# Patient Record
Sex: MALE | Race: WHITE | URBAN - METROPOLITAN AREA
[De-identification: names, ages, dates, MRNs, and addresses within clinical notes are randomized per-mention and may not be internally consistent; named-entity substitution may affect disease eponyms.]

---

## 2017-05-31 ENCOUNTER — HISTORICAL (OUTPATIENT)
Dept: ADMINISTRATIVE | Facility: HOSPITAL | Age: 77
End: 2017-05-31

## 2017-05-31 LAB
ALBUMIN SERPL-MCNC: 3.5 GM/DL (ref 3.4–5)
ALP SERPL-CCNC: 61 UNIT/L (ref 50–136)
ALT SERPL-CCNC: 25 UNIT/L (ref 12–78)
AST SERPL-CCNC: 11 UNIT/L (ref 15–37)
BILIRUB SERPL-MCNC: 0.5 MG/DL (ref 0.2–1)
BILIRUBIN DIRECT+TOT PNL SERPL-MCNC: 0.2 MG/DL (ref 0–0.2)
BILIRUBIN DIRECT+TOT PNL SERPL-MCNC: 0.3 MG/DL (ref 0–0.8)
LIVER PROFILE INTERP: ABNORMAL
PROT SERPL-MCNC: 7.1 GM/DL (ref 6.4–8.2)
VALPROATE SERPL-MCNC: 72.1 MCG/ML (ref 50–100)

## 2017-07-11 ENCOUNTER — HISTORICAL (OUTPATIENT)
Dept: RADIOLOGY | Facility: HOSPITAL | Age: 77
End: 2017-07-11

## 2017-07-11 LAB
ABS NEUT (OLG): 1.22 X10(3)/MCL (ref 2.1–9.2)
ALBUMIN SERPL-MCNC: 3.4 GM/DL (ref 3.4–5)
ALBUMIN/GLOB SERPL: 1.1 {RATIO}
ALP SERPL-CCNC: 57 UNIT/L (ref 50–136)
ALT SERPL-CCNC: 35 UNIT/L (ref 12–78)
AST SERPL-CCNC: 17 UNIT/L (ref 15–37)
BILIRUB SERPL-MCNC: 0.5 MG/DL (ref 0.2–1)
BILIRUBIN DIRECT+TOT PNL SERPL-MCNC: 0.2 MG/DL (ref 0–0.2)
BILIRUBIN DIRECT+TOT PNL SERPL-MCNC: 0.3 MG/DL (ref 0–0.8)
BUN SERPL-MCNC: 26 MG/DL (ref 7–18)
CALCIUM SERPL-MCNC: 8.3 MG/DL (ref 8.5–10.1)
CHLORIDE SERPL-SCNC: 107 MMOL/L (ref 98–107)
CHOLEST SERPL-MCNC: 102 MG/DL (ref 0–200)
CHOLEST/HDLC SERPL: 2 {RATIO} (ref 0–5)
CK SERPL-CCNC: 76 UNIT/L (ref 39–308)
CO2 SERPL-SCNC: 31 MMOL/L (ref 21–32)
CREAT SERPL-MCNC: 0.81 MG/DL (ref 0.7–1.3)
EOSINOPHIL NFR BLD MANUAL: 6 % (ref 0–8)
ERYTHROCYTE [DISTWIDTH] IN BLOOD BY AUTOMATED COUNT: 17 % (ref 11.5–17)
GLOBULIN SER-MCNC: 3.2 GM/DL (ref 2.4–3.5)
GLUCOSE SERPL-MCNC: 92 MG/DL (ref 74–106)
HCT VFR BLD AUTO: 37.2 % (ref 42–52)
HDLC SERPL-MCNC: 52 MG/DL (ref 35–60)
HGB BLD-MCNC: 11.9 GM/DL (ref 14–18)
LDLC SERPL CALC-MCNC: 33 MG/DL (ref 0–129)
LYMPHOCYTES NFR BLD MANUAL: 17 %
LYMPHOCYTES NFR BLD MANUAL: 43 % (ref 13–40)
MCH RBC QN AUTO: 25.2 PG (ref 27–31)
MCHC RBC AUTO-ENTMCNC: 32 GM/DL (ref 33–36)
MCV RBC AUTO: 78.8 FL (ref 80–94)
MONOCYTES NFR BLD MANUAL: 5 % (ref 2–11)
NEUTROPHILS NFR BLD MANUAL: 28 % (ref 47–80)
PLATELET # BLD AUTO: 128 X10(3)/MCL (ref 130–400)
PLATELET # BLD EST: ABNORMAL 10*3/UL
PMV BLD AUTO: 11 FL (ref 7.4–10.4)
POTASSIUM SERPL-SCNC: 4.3 MMOL/L (ref 3.5–5.1)
PROT SERPL-MCNC: 6.6 GM/DL (ref 6.4–8.2)
RBC # BLD AUTO: 4.72 X10(6)/MCL (ref 4.7–6.1)
SODIUM SERPL-SCNC: 143 MMOL/L (ref 136–145)
TRIGL SERPL-MCNC: 84 MG/DL (ref 30–150)
VLDLC SERPL CALC-MCNC: 17 MG/DL
WBC # SPEC AUTO: 3.9 X10(3)/MCL (ref 4.5–11.5)

## 2017-07-31 ENCOUNTER — HISTORICAL (OUTPATIENT)
Dept: ADMINISTRATIVE | Facility: HOSPITAL | Age: 77
End: 2017-07-31

## 2017-07-31 LAB
ALBUMIN SERPL-MCNC: 3.4 GM/DL (ref 3.4–5)
ALP SERPL-CCNC: 59 UNIT/L (ref 50–136)
ALT SERPL-CCNC: 32 UNIT/L (ref 12–78)
AST SERPL-CCNC: 19 UNIT/L (ref 15–37)
BILIRUB SERPL-MCNC: 0.6 MG/DL (ref 0.2–1)
BILIRUBIN DIRECT+TOT PNL SERPL-MCNC: 0.1 MG/DL (ref 0–0.2)
BILIRUBIN DIRECT+TOT PNL SERPL-MCNC: 0.5 MG/DL (ref 0–0.8)
LIVER PROFILE INTERP: NORMAL
PROT SERPL-MCNC: 6.8 GM/DL (ref 6.4–8.2)
VALPROATE SERPL-MCNC: 71.5 MCG/ML (ref 50–100)

## 2017-09-28 ENCOUNTER — HISTORICAL (OUTPATIENT)
Dept: ADMINISTRATIVE | Facility: HOSPITAL | Age: 77
End: 2017-09-28

## 2017-09-28 ENCOUNTER — HISTORICAL (OUTPATIENT)
Dept: NEUROSURGERY | Facility: CLINIC | Age: 77
End: 2017-09-28

## 2017-09-28 LAB
ABS NEUT (OLG): 1.21 X10(3)/MCL (ref 2.1–9.2)
APTT PPP: 29.1 SECOND(S) (ref 24.8–36.9)
BASOPHILS # BLD AUTO: 0 X10(3)/MCL (ref 0–0.2)
BASOPHILS NFR BLD AUTO: 0 %
BUN SERPL-MCNC: 29 MG/DL (ref 7–18)
CALCIUM SERPL-MCNC: 9.1 MG/DL (ref 8.5–10.1)
CHLORIDE SERPL-SCNC: 104 MMOL/L (ref 98–107)
CO2 SERPL-SCNC: 30 MMOL/L (ref 21–32)
CREAT SERPL-MCNC: 0.96 MG/DL (ref 0.7–1.3)
EOSINOPHIL # BLD AUTO: 0.1 X10(3)/MCL (ref 0–0.9)
EOSINOPHIL NFR BLD AUTO: 2 %
ERYTHROCYTE [DISTWIDTH] IN BLOOD BY AUTOMATED COUNT: 15.8 % (ref 11.5–17)
GLUCOSE SERPL-MCNC: 92 MG/DL (ref 74–106)
HCT VFR BLD AUTO: 39.3 % (ref 42–52)
HGB BLD-MCNC: 12.5 GM/DL (ref 14–18)
INR PPP: 1.13 (ref 0–1.27)
LYMPHOCYTES # BLD AUTO: 2.6 X10(3)/MCL (ref 0.6–4.6)
LYMPHOCYTES NFR BLD AUTO: 61 %
MCH RBC QN AUTO: 25 PG (ref 27–31)
MCHC RBC AUTO-ENTMCNC: 31.8 GM/DL (ref 33–36)
MCV RBC AUTO: 78.4 FL (ref 80–94)
MONOCYTES # BLD AUTO: 0.4 X10(3)/MCL (ref 0.1–1.3)
MONOCYTES NFR BLD AUTO: 8 %
NEUTROPHILS # BLD AUTO: 1.21 X10(3)/MCL (ref 1.4–7.9)
NEUTROPHILS NFR BLD AUTO: 28 %
PLATELET # BLD AUTO: 139 X10(3)/MCL (ref 130–400)
PMV BLD AUTO: 10.5 FL (ref 9.4–12.4)
POTASSIUM SERPL-SCNC: 4.2 MMOL/L (ref 3.5–5.1)
PROTHROMBIN TIME: 14.3 SECOND(S) (ref 12.2–14.7)
RBC # BLD AUTO: 5.01 X10(6)/MCL (ref 4.7–6.1)
SODIUM SERPL-SCNC: 144 MMOL/L (ref 136–145)
WBC # SPEC AUTO: 4.3 X10(3)/MCL (ref 4.5–11.5)

## 2018-01-13 ENCOUNTER — HISTORICAL (OUTPATIENT)
Dept: ADMINISTRATIVE | Facility: HOSPITAL | Age: 78
End: 2018-01-13

## 2018-01-15 LAB — FINAL CULTURE: NORMAL

## 2018-04-11 ENCOUNTER — HISTORICAL (OUTPATIENT)
Dept: LAB | Facility: HOSPITAL | Age: 78
End: 2018-04-11

## 2018-04-11 LAB
APPEARANCE, UA: CLEAR
BACTERIA #/AREA URNS AUTO: ABNORMAL /HPF
BILIRUB UR QL STRIP: NEGATIVE
COLOR UR: YELLOW
GLUCOSE (UA): NEGATIVE
HGB UR QL STRIP: NEGATIVE
KETONES UR QL STRIP: NEGATIVE
LEUKOCYTE ESTERASE UR QL STRIP: NEGATIVE
MUCOUS THREADS URNS QL MICRO: ABNORMAL
NITRITE UR QL STRIP.AUTO: NEGATIVE
PH UR STRIP: 6 [PH] (ref 5–9)
PROT UR QL STRIP: NEGATIVE
RBC #/AREA URNS HPF: ABNORMAL /[HPF]
SP GR UR STRIP: 1.02 (ref 1–1.03)
SQUAMOUS EPITHELIAL, UA: ABNORMAL
UROBILINOGEN UR STRIP-ACNC: 0.2
WBC #/AREA URNS AUTO: ABNORMAL /[HPF]

## 2018-04-13 LAB — FINAL CULTURE: NO GROWTH

## 2018-06-08 ENCOUNTER — HISTORICAL (OUTPATIENT)
Dept: LAB | Facility: HOSPITAL | Age: 78
End: 2018-06-08

## 2018-06-08 LAB
APPEARANCE, UA: NORMAL
BACTERIA #/AREA URNS AUTO: NORMAL /HPF
BILIRUB UR QL STRIP: NEGATIVE
BUN SERPL-MCNC: 29 MG/DL (ref 7–18)
CALCIUM SERPL-MCNC: 8.6 MG/DL (ref 8.5–10.1)
CHLORIDE SERPL-SCNC: 107 MMOL/L (ref 98–107)
CO2 SERPL-SCNC: 28 MMOL/L (ref 21–32)
COLOR UR: YELLOW
CREAT SERPL-MCNC: 0.82 MG/DL (ref 0.7–1.3)
CREAT/UREA NIT SERPL: 35.4
GLUCOSE (UA): NEGATIVE
GLUCOSE SERPL-MCNC: 104 MG/DL (ref 74–106)
HGB UR QL STRIP: NEGATIVE
KETONES UR QL STRIP: NEGATIVE
LEUKOCYTE ESTERASE UR QL STRIP: NEGATIVE
NITRITE UR QL STRIP.AUTO: NEGATIVE
PH UR STRIP: 8 [PH] (ref 5–9)
POTASSIUM SERPL-SCNC: 4.4 MMOL/L (ref 3.5–5.1)
PROT UR QL STRIP: NEGATIVE
RBC #/AREA URNS HPF: NORMAL /[HPF]
SODIUM SERPL-SCNC: 144 MMOL/L (ref 136–145)
SP GR UR STRIP: 1.02 (ref 1–1.03)
SQUAMOUS EPITHELIAL, UA: NORMAL
UROBILINOGEN UR STRIP-ACNC: 1
WBC #/AREA URNS AUTO: NORMAL /HPF (ref 0–3)

## 2018-08-23 ENCOUNTER — HOSPITAL ENCOUNTER (OUTPATIENT)
Dept: INTENSIVE CARE | Facility: HOSPITAL | Age: 78
End: 2018-08-25
Attending: INTERNAL MEDICINE | Admitting: INTERNAL MEDICINE

## 2018-08-23 LAB
ABS NEUT (OLG): 1.56 X10(3)/MCL (ref 2.1–9.2)
ALBUMIN SERPL-MCNC: 2.9 GM/DL (ref 3.4–5)
ALBUMIN/GLOB SERPL: 0.7 {RATIO}
ALP SERPL-CCNC: 77 UNIT/L (ref 50–136)
ALT SERPL-CCNC: 19 UNIT/L (ref 12–78)
APPEARANCE, UA: CLEAR
AST SERPL-CCNC: 25 UNIT/L (ref 15–37)
BACTERIA SPEC CULT: NORMAL /HPF
BASOPHILS # BLD AUTO: 0 X10(3)/MCL (ref 0–0.2)
BASOPHILS NFR BLD AUTO: 0 %
BILIRUB SERPL-MCNC: 0.3 MG/DL (ref 0.2–1)
BILIRUB UR QL STRIP: NEGATIVE
BILIRUBIN DIRECT+TOT PNL SERPL-MCNC: 0.1 MG/DL (ref 0–0.2)
BILIRUBIN DIRECT+TOT PNL SERPL-MCNC: 0.2 MG/DL (ref 0–0.8)
BUN SERPL-MCNC: 28 MG/DL (ref 7–18)
CALCIUM SERPL-MCNC: 8.6 MG/DL (ref 8.5–10.1)
CHLORIDE SERPL-SCNC: 104 MMOL/L (ref 98–107)
CO2 SERPL-SCNC: 31 MMOL/L (ref 21–32)
COLOR UR: YELLOW
CREAT SERPL-MCNC: 0.91 MG/DL (ref 0.7–1.3)
EOSINOPHIL # BLD AUTO: 0.1 X10(3)/MCL (ref 0–0.9)
EOSINOPHIL NFR BLD AUTO: 1 %
ERYTHROCYTE [DISTWIDTH] IN BLOOD BY AUTOMATED COUNT: 17.2 % (ref 11.5–17)
GLOBULIN SER-MCNC: 4 GM/DL (ref 2.4–3.5)
GLUCOSE (UA): NEGATIVE
GLUCOSE SERPL-MCNC: 101 MG/DL (ref 74–106)
HCT VFR BLD AUTO: 34.5 % (ref 42–52)
HGB BLD-MCNC: 10.9 GM/DL (ref 14–18)
HGB UR QL STRIP: NEGATIVE
KETONES UR QL STRIP: NEGATIVE
LEUKOCYTE ESTERASE UR QL STRIP: NEGATIVE
LYMPHOCYTES # BLD AUTO: 2.7 X10(3)/MCL (ref 0.6–4.6)
LYMPHOCYTES NFR BLD AUTO: 56 %
MAGNESIUM SERPL-MCNC: 2.2 MG/DL (ref 1.8–2.4)
MCH RBC QN AUTO: 26.3 PG (ref 27–31)
MCHC RBC AUTO-ENTMCNC: 31.6 GM/DL (ref 33–36)
MCV RBC AUTO: 83.3 FL (ref 80–94)
MONOCYTES # BLD AUTO: 0.5 X10(3)/MCL (ref 0.1–1.3)
MONOCYTES NFR BLD AUTO: 10 %
NEUTROPHILS # BLD AUTO: 1.56 X10(3)/MCL (ref 1.4–7.9)
NEUTROPHILS NFR BLD AUTO: 32 %
NITRITE UR QL STRIP: NEGATIVE
PH UR STRIP: 6.5 [PH] (ref 5–9)
PHOSPHATE SERPL-MCNC: 3.7 MG/DL (ref 2.5–4.9)
PLATELET # BLD AUTO: 146 X10(3)/MCL (ref 130–400)
PMV BLD AUTO: 9.3 FL (ref 9.4–12.4)
POTASSIUM SERPL-SCNC: 4.2 MMOL/L (ref 3.5–5.1)
PROT SERPL-MCNC: 6.9 GM/DL (ref 6.4–8.2)
PROT UR QL STRIP: NEGATIVE
RBC # BLD AUTO: 4.14 X10(6)/MCL (ref 4.7–6.1)
RBC #/AREA URNS HPF: NORMAL /[HPF]
SODIUM SERPL-SCNC: 140 MMOL/L (ref 136–145)
SP GR UR STRIP: 1.02 (ref 1–1.03)
SQUAMOUS EPITHELIAL, UA: NORMAL
TROPONIN I SERPL-MCNC: <0.02 NG/ML (ref 0.02–0.49)
UROBILINOGEN UR STRIP-ACNC: 1
VALPROATE SERPL-MCNC: 90 MCG/ML (ref 50–100)
WBC # SPEC AUTO: 4.9 X10(3)/MCL (ref 4.5–11.5)
WBC #/AREA URNS HPF: NORMAL /HPF

## 2018-08-26 LAB — FINAL CULTURE: NO GROWTH

## 2018-12-04 ENCOUNTER — HISTORICAL (OUTPATIENT)
Dept: LAB | Facility: HOSPITAL | Age: 78
End: 2018-12-04

## 2018-12-04 LAB
BUN SERPL-MCNC: 28 MG/DL (ref 7–18)
CALCIUM SERPL-MCNC: 8.8 MG/DL (ref 8.5–10.1)
CHLORIDE SERPL-SCNC: 104 MMOL/L (ref 98–107)
CO2 SERPL-SCNC: 31 MMOL/L (ref 21–32)
CREAT SERPL-MCNC: 0.91 MG/DL (ref 0.7–1.3)
CREAT/UREA NIT SERPL: 30.8
GLUCOSE SERPL-MCNC: 103 MG/DL (ref 74–106)
POTASSIUM SERPL-SCNC: 4.6 MMOL/L (ref 3.5–5.1)
SODIUM SERPL-SCNC: 140 MMOL/L (ref 136–145)

## 2018-12-05 ENCOUNTER — HISTORICAL (OUTPATIENT)
Dept: LAB | Facility: HOSPITAL | Age: 78
End: 2018-12-05

## 2018-12-05 LAB
APPEARANCE, UA: CLEAR
BACTERIA SPEC CULT: ABNORMAL /HPF
BILIRUB UR QL STRIP: NEGATIVE
COLOR UR: YELLOW
GLUCOSE (UA): NEGATIVE
HGB UR QL STRIP: ABNORMAL
KETONES UR QL STRIP: NEGATIVE
LEUKOCYTE ESTERASE UR QL STRIP: ABNORMAL
NITRITE UR QL STRIP: POSITIVE
PH UR STRIP: 6.5 [PH] (ref 5–9)
PROT UR QL STRIP: NEGATIVE
RBC #/AREA URNS HPF: ABNORMAL /HPF
SP GR UR STRIP: 1.02 (ref 1–1.03)
SQUAMOUS EPITHELIAL, UA: ABNORMAL
UA WBC MAN: ABNORMAL /HPF
UROBILINOGEN UR STRIP-ACNC: 0.2

## 2018-12-12 ENCOUNTER — HISTORICAL (OUTPATIENT)
Dept: LAB | Facility: HOSPITAL | Age: 78
End: 2018-12-12

## 2018-12-12 LAB
BUN SERPL-MCNC: 28 MG/DL (ref 7–18)
CALCIUM SERPL-MCNC: 8.5 MG/DL (ref 8.5–10.1)
CHLORIDE SERPL-SCNC: 105 MMOL/L (ref 98–107)
CO2 SERPL-SCNC: 28 MMOL/L (ref 21–32)
CREAT SERPL-MCNC: 1.03 MG/DL (ref 0.7–1.3)
CREAT/UREA NIT SERPL: 27.2
GLUCOSE SERPL-MCNC: 113 MG/DL (ref 74–106)
POTASSIUM SERPL-SCNC: 4.3 MMOL/L (ref 3.5–5.1)
SODIUM SERPL-SCNC: 142 MMOL/L (ref 136–145)

## 2019-04-17 ENCOUNTER — HISTORICAL (OUTPATIENT)
Dept: LAB | Facility: HOSPITAL | Age: 79
End: 2019-04-17

## 2019-04-17 LAB
APPEARANCE, UA: ABNORMAL
BACTERIA SPEC CULT: ABNORMAL /HPF
BILIRUB UR QL STRIP: NEGATIVE
COLOR UR: YELLOW
GLUCOSE (UA): NEGATIVE
HGB UR QL STRIP: ABNORMAL
KETONES UR QL STRIP: ABNORMAL
LEUKOCYTE ESTERASE UR QL STRIP: ABNORMAL
NITRITE UR QL STRIP: NEGATIVE
PH UR STRIP: 5.5 [PH] (ref 5–9)
PROT UR QL STRIP: ABNORMAL
RBC #/AREA URNS HPF: 21 /HPF (ref 0–2)
SP GR UR STRIP: 1.03 (ref 1–1.03)
SQUAMOUS EPITHELIAL, UA: ABNORMAL
UROBILINOGEN UR STRIP-ACNC: 0.2
WBC #/AREA URNS HPF: 294 /HPF (ref 0–3)

## 2019-05-08 ENCOUNTER — HISTORICAL (OUTPATIENT)
Dept: LAB | Facility: HOSPITAL | Age: 79
End: 2019-05-08

## 2019-05-08 LAB
APPEARANCE, UA: ABNORMAL
BACTERIA #/AREA URNS AUTO: ABNORMAL /HPF
BILIRUB UR QL STRIP: NEGATIVE
COLOR UR: YELLOW
GLUCOSE (UA): NEGATIVE
HGB UR QL STRIP: ABNORMAL
KETONES UR QL STRIP: NEGATIVE
LEUKOCYTE ESTERASE UR QL STRIP: ABNORMAL
NITRITE UR QL STRIP.AUTO: POSITIVE
PH UR STRIP: 7.5 [PH] (ref 5–9)
PROT UR QL STRIP: ABNORMAL
RBC #/AREA URNS HPF: ABNORMAL /[HPF]
SP GR UR STRIP: 1.02 (ref 1–1.03)
SQUAMOUS EPITHELIAL, UA: ABNORMAL
UROBILINOGEN UR STRIP-ACNC: 0.2
WBC #/AREA URNS AUTO: ABNORMAL /HPF

## 2019-05-15 ENCOUNTER — HISTORICAL (OUTPATIENT)
Dept: LAB | Facility: HOSPITAL | Age: 79
End: 2019-05-15

## 2019-05-15 LAB
APPEARANCE, UA: ABNORMAL
BACTERIA SPEC CULT: ABNORMAL /HPF
BILIRUB UR QL STRIP: NEGATIVE
COLOR UR: ABNORMAL
GLUCOSE (UA): NEGATIVE
HGB UR QL STRIP: ABNORMAL
KETONES UR QL STRIP: ABNORMAL
LEUKOCYTE ESTERASE UR QL STRIP: ABNORMAL
NITRITE UR QL STRIP: NEGATIVE
PH UR STRIP: 5.5 [PH] (ref 5–9)
PROT UR QL STRIP: ABNORMAL
RBC #/AREA URNS HPF: ABNORMAL /HPF
SP GR UR STRIP: 1.03 (ref 1–1.03)
SQUAMOUS EPITHELIAL, UA: ABNORMAL
UROBILINOGEN UR STRIP-ACNC: 0.2
WBC #/AREA URNS HPF: 21 /HPF (ref 0–3)

## 2019-05-17 LAB — FINAL CULTURE: NO GROWTH

## 2022-04-10 ENCOUNTER — HISTORICAL (OUTPATIENT)
Dept: ADMINISTRATIVE | Facility: HOSPITAL | Age: 82
End: 2022-04-10

## 2022-04-29 VITALS
HEIGHT: 63 IN | DIASTOLIC BLOOD PRESSURE: 78 MMHG | WEIGHT: 170.63 LBS | SYSTOLIC BLOOD PRESSURE: 122 MMHG | BODY MASS INDEX: 30.23 KG/M2

## 2022-04-30 NOTE — CONSULTS
DATE OF CONSULTATION:  08/24/2018    ATTENDING PHYSICIAN:  Physician ER  CONSULTING PHYSICIAN:  Sergey Pompa MD    CHIEF COMPLAINT:  Urinary frequency.    HISTORY OF PRESENT ILLNESS:  This patient is a 77-year-old  male with a history of Parkinson's-like disease and Alzheimer's encephalitis who presents to the emergency department on 08/23 for complaints of confusion, weakness, and urinary frequency and urgency.  The patient is a poor historian himself.  Much of the history was obtained from medical records and his wife.  His wife, his daughter, and his sitter were there on my evaluation.     From a urologic standpoint, they state that he was recently started on tamsulosin during a separate admission.  He follows normally with Dr. Knowles.  He has not had any baseline urodynamics.  During this admission, his lab work was unremarkable.  He had CT of the head and MRI that were negative.  Neurology was consulted for evaluation as well.  They state he has had urinary frequency once an hour.  No bladder scans have been done.  I do not see any urine cultures.  He had a culture from June that was negative.  He has never been on any bladder medications.    REVIEW OF SYSTEMS:  12-point review of systems negative other than HPI.    ALLERGIES:  No known drug allergies.    MEDICATIONS:  Medication admin record well documented in the chart and reviewed.    PAST MEDICAL HISTORY:  Anxiety, Alzheimer's, Parkinson's, BPH, dyslipidemia, and obesity.    PAST SURGICAL HISTORY:  Multiple lumbar procedures and spinal punctures, cystoscopy with stents, cystoscopy with [facility] ,hydrocelectomy, and tonsillectomy.    SOCIAL HISTORY:  Previous alcohol, stopped at age 77.  He lives with his wife and a caretaker.  Does have light tobacco use.  No drugs.    FAMILY HISTORY:  CHF, diabetes, and colon cancer.    PHYSICAL EXAMINATION:  VITAL SIGNS:  Currently his vitals are stable.   GENERAL:  He is well developed, well  nourished, in no acute distress.  He is at his baseline.   HEENT:  Normocephalic, atraumatic.  Pupils equal, round, and reactive to light and accommodation.  Extraocular movements intact.  Nares patent.  Oropharynx clear.   CARDIOVASCULAR:  Regular rate and rhythm.   LUNGS:  Respirations unlabored.   ABDOMEN:  Soft, nontender, nondistended.   EXTREMITIES:  No cyanosis, clubbing, or edema.   :  He is wearing a brief.  Normal external male genitalia.   NEUROLOGIC:  Awake and alert.  He is at his baseline.  No focal deficits from baseline.    ASSESSMENT:  This is a 77-year-old male with Parkinson's-like illness and benign prostatic hypertrophy with urinary urgency and frequency.    PLAN:  I recommend a bladder scan now to ensure no retention.  If his bladder is holding greater than 250 or 300, I will consider Smith catheter decompression for at least 72 hours.  He should continue his Flomax therapy.  They should continue behavior modifications and limit his p.m. fluid intake but increase his fluid intake during the day.  He should avoid caffeine which he has.  If his PVR is less than 200, we can consider starting him on an anticholinergic with solifenacin 10 mg.  Discussed the risk of constipation, dry eyes, dry mouth, and cognitive dysfunction.  I want to start him on Myrbetriq 50 mg but it is non formulary here.  Certainly if he does not tolerate anticholinergics, he can be switched to beta 3 agonist.  He may need baseline urodynamics on an outpatient basis.  We will also check a urine culture to ensure he does not have a UTI which could be exacerbating these irritative lower urinary tract symptoms.  I did have a discussion with them about pathophysiology of Parkinson's-like illness as well as possible concurrent BPH.  Certainly with his age, BPH is very likely with some of the cause of outlet obstruction and obstructive LUTS.  Certainly he may have some sphincteric bradykinesia due to the Parkinson's-like illness.   He also is very much at risk for detrusor overactivity from the Parkinson's as well.  They should also ensure that he is on a good bowel regimen and avoiding any constipation which can contribute to incomplete bladder emptying.  I also think that his mobility can be an issue.  We discussed good voiding modifications.  He definitely should follow up with Dr. Knowles in the next week or two to assess his progress.  She may do need to     do some medication changes.  We appreciate the consult.  Please call with any questions or concerns in the interim.        ______________________________  MD CAMILLA Salomon/  DD:  08/24/2018  Time:  05:21PM  DT:  08/24/2018  Time:  06:21PM  Job #:  [id number]

## 2022-04-30 NOTE — DISCHARGE SUMMARY
Patient:   Thai Gray            MRN: [id number]            FIN: [id number]               Age:   77 years     Sex:  Male     :  1940   Associated Diagnoses:   None   Author:   Good MALDONADO, Rich TUCKER      Discharge Information      Discharge Summary Information   Physicians   Attending Physician - Good MALDONADO, Rich TUCKER  Consulting Physician - Bel MALDONADO, Gigi CRENSHAW  Consulting Physician - Eleni MALDONADO, Piper MICHELLE  Primary Care Physician - Mariella MALDONADO , [facility]     Discharge Diagnosis   Urinary frequency  Alzheimer's Disease  Parkinson's Disease   Procedures   No procedures recorded for this visit.   Immunizations   No immunizations recorded for this visit.     Discharge Medications   Prescribed  Myrbetriq (mirabegron 50 mg oral tablet, extended release) 50 mg, Oral, Daily  tamsulosin (Flomax 0.4 mg oral capsule) 0.4 mg, Oral, Daily  Continue  QUEtiapine (QUEtiapine 50 mg oral tablet) 50 mg, Oral, Daily  QUEtiapine (Seroquel 100 mg oral tablet) 100 mg, Oral, At Bedtime  Template Non-Formulary Med (garcinia cambogia) 1 tab, Oral, Daily  tamsulosin (Flomax 0.4 mg oral capsule) 0.4 mg, Oral, Daily  albuterol (VENTOLIN HFA AER)  carbidopa-levodopa (carbidopa-levodopa 25 mg-100 mg oral tablet) 1 tab(s), Oral, TID  divalproex sodium (Depakote 250 mg oral delayed release tablet) See Instructions  divalproex sodium (Depakote  mg oral tablet, extended release) 1,000 mg, Oral, Once a day (at bedtime)  levothyroxine (levothyroxine 100 mcg (0.1 mg) oral tablet) 100 mcg, Oral, Daily  memantine (Namenda XR 28 mg oral capsule, extended release) 28 mg, Oral, Daily  multivitamin with iron (EnLyte oral capsule) 1 cap(s), Oral, Daily  polyethylene glycol 3350 (MiraLax oral powder for reconstitution) 17 gm, Oral, Daily  rivastigmine (Exelon 9.5 mg/24 hr transdermal film, extended release) 1 patch(es), TD, Daily  rosuvastatin (Crestor 20 mg oral tablet) 20 mg, Oral, Once a day (at bedtime)  sertraline  "(Zoloft 100 mg oral tablet) 150 mg, Oral, Daily  vilazodone (VIIBRYD      TAB 20MG) 20 mg, Oral, Daily  vilazodone (Viibryd 20 mg oral tablet) 20 mg, Oral, Daily  Discontinue  QUEtiapine (QUETIAPINE   TAB 100MG)  QUEtiapine (QUETIAPINE   TAB 25MG) 25 mg, Oral, qPM  carbidopa-levodopa (CARB/LEVO    TAB 10-100MG)  divalproex sodium (DIVALPROEX   TAB 250MG DR) 250 mg, Oral, BID  divalproex sodium (DIVALPROEX   TAB 500MG ER) 1,000 mg, At Bedtime  levothyroxine (LEVOTHYROXIN TAB 100MCG) 100 mcg, Oral, Daily  memantine (MEMANTINE HC CAP 28MG ER)  phenazopyridine (Pyridium 200 mg oral tablet) 200 mg, Oral, TID, PRN as needed for urinary discomfort  rivastigmine (Exelon 9.5 mg/24 hr transdermal film, extended release) 1 patch(es), TD, Daily  sertraline (SERTRALINE   TAB 100MG)        Hospital Course   Hospital Course   The patient is a 77 year old  male with hx of Parkinson's, Alzheimer's, and encephalitis who presented to the ED on 8/23/18 with complaints of confusion, weakness, and urinary incontinence since around midnight. Patient is a poor historian and history was obtained from prior medical records and ED visit. Sitter in room during time of visit. Wife reported patient had eaten his breakfast really fast and began to vomit on the way to a doctor's appointment on 2 days ago. She says he normally has a significant appetite, but a poor intake of fluids. She adds patient was unusually disoriented from his baseline. Wife said patient recovered from N/V episode and was thinking and feeling better yesterday. This morning around 0000, wife states patient woke up "soaking wet" and asking for something to eat. Wife explained he was needing to go to the restroom, but found it difficult to ambulate to the toilet, even with help from his walker. She added patient seems confused too, and is unable to answer what year it is while in the ED. Wife also complained of RLE swelling and right eye swelling that this is been " chronic. Patient denies having any headache, abdominal pain, chest pain, nausea, or rash at present. Labwork in the ED was essentially unremarkable, normal electrolytes, negative troponin, and normal LFTs with the exception of low albumin 2.9, and a low Hbg 10.9. UA in the ED was also unremarkable. CT Head in the ED showed No acute intracranial process or acute infarct identified. Brain MRI was negative as well. Patient has been admitted to the hospitalist service for further management and neurology has been consulted. Neurology evaluated the patient who stated patient was stable for discharge and that a facility may be the next option for the patient given his advancing dementia and Parkinsons. Patient was kept overnight with no acute events. On day of discharge, patient was seen and examined with wife and daughter at the bedside. His private CNA was also in the room, assisting patient with transfer to the bathroom. Wife reported patient had urinated 9 times last night and consistently reports frequent and urgent urination. Urology was consulted. UA was negative.  Urology recommended to continue Flomax and added Myrbetriq. Otherwise, patient had no complaints and agreed with plan for discharge  Discharge to home  Diet: Regular   Activity: As tolerated  Medications: see med rec  Follow up: Urology 1-2 weeks  Total time spent discharging the patient: 31 minutes   .        Physical Examination      Vital Signs (last 24 hrs)_____  Last Charted___________  Temp Oral     36.7 DegC  (AUG 24 08:07)  Heart Rate Peripheral   90 bpm  (AUG 24 12:15)  Resp Rate         18 br/min  (AUG 24 12:15)  SBP      H 144mmHg  (AUG 24 12:15)  DBP      86 mmHg  (AUG 24 12:15)  SpO2      96 %  (AUG 24 12:15)     General:  No acute distress, Obese, comfortable, talkative.    Eye:  Pupils are equal, round and reactive to light, Extraocular movements are intact, Normal conjunctiva.    HENT:  Normocephalic, Oral mucosa is moist, No pharyngeal  erythema.    Neck:  Supple, Non-tender.    Respiratory:  Lungs are clear to auscultation, Respirations are non-labored, Breath sounds are equal.    Cardiovascular:  Normal rate, Regular rhythm, No murmur, Good pulses equal in all extremities, No edema.    Gastrointestinal:  Soft, Non-tender, Non-distended, Normal bowel sounds.    Musculoskeletal:  Normal range of motion, Normal strength, No tenderness, No swelling.    Integumentary:  Warm, Dry, No rash.    Neurologic:  Alert, No focal deficits, Oriented to president and name.    Psychiatric:  Cooperative, demented.       Discharge Plan   Discharge Summary Plan   Discharge disposition: discharge to home into the care of family member.     Prescriptions: called to pharmacy.     Course   Progressing as expected.

## 2022-04-30 NOTE — ED PROVIDER NOTES
"   Patient:   Thai Gray            MRN: [id number]            FIN: [id number]               Age:   77 years     Sex:  Male     :  1940   Associated Diagnoses:   Acute dehydration; Reactive confusion   Author:   Jori SANCHEZ MD, Ben BRAN      Basic Information   Time seen: Date & time 2018 03:15:00.   History source: Patient, significant other.   Arrival mode: Private vehicle.   History limitation: None.   Additional information: Patient's physician(s): Mariella MALDONADO , Frantz Castillo.      History of Present Illness   The patient presents with 78 yo  male with hx of Parkinson's, Alzheimer's, and encephalitis presents to the ED with complaints of confusion, weakness, and urinary incontinence since around midnight tonight. Wife says on Tuesday, patient had eaten his breakfast really fast and began to vomit on the way to a doctor's appointment. She says he normally has a significant appetite, but a poor intake of fluids. She adds patient was unusually disoriented that day even for Alzheimer's patient. Wife says patient recovered from N/V episode and was thinking and feeling better yesterday. This morning around 0000, wife states patient woke up "soaking wet" and asking for something to eat. Wife explains he was needing to go to the restroom, but found it difficult to ambulate to the toilet, even with help from his walker. She adds patient seems oddly confused too, and is unable to answer what year it is while in the ED. Wife also complains of RLE swelling and right eye swelling that this is been chronic. Patient denies having any headache, abdominal pain, chest pain, nausea, or rash at present. Patient was recently evaluated for confusion and discharged.  The onset was 18 00:00.  The course/duration of symptoms is constant.  Location: none. The character of symptoms is Weak.  The degree at onset was moderate.  The degree at present is moderate.  Risk factors consist of none.  Therapy " today: none.  Associated symptoms: none.  Additional history: none.  Baseline oriented ×3.        Review of Systems   Constitutional symptoms:  Weakness, no fever, no chills, no sweats.    Skin symptoms:  No rash,    Eye symptoms:  No recent vision problems,    ENMT symptoms:  denies rhinorrhea, No ear pain, , Ear(s): right eye swelling.    Respiratory symptoms:  No shortness of breath, no orthopnea, no cough.    Cardiovascular symptoms:  Peripheral edema (RLE), No chest pain,    Gastrointestinal symptoms:  Vomiting (resolved), no abdominal pain, no nausea, no diarrhea.    Genitourinary symptoms:  Excessive urination, urinary incontinence, No dysuria,    Musculoskeletal symptoms:  No back pain,    Neurologic symptoms:  Altered level of consciousness, numbness, Confusion, No headache,    Psychiatric symptoms:  No anxiety,    Endocrine symptoms:  Polyuria.   Allergy/immunologic symptoms:  No seasonal allergies, no food allergies.              Additional review of systems information: All other systems reviewed and otherwise negative.      Health Status   Allergies: No known allergies.   Medications:  (Selected)   Inpatient Medications  Ordered  IVF Normal Saline NS Bolus 500ml 500 mL: 500 mL, 500 mL, IV, 500 mL/hr, order duration: 1 dose(s), start date 08/23/18 3:41:00 CDT, stop date 08/23/18 4:40:00 CDT, bolus dose: 500 mL, Then 125 ml/hr if no history of CHF.  Prescriptions  Prescribed  Depakote 250 mg oral delayed release tablet: See Instructions, 1 tab(s) Oral Daily in AM and 1 tab in afternoon, # 60 tab(s), 5 Refill(s), Pharmacy: [facility]  Depakote  mg oral tablet, extended release: 1,000 mg = 2 tab(s), Oral, Once a day (at bedtime), # 60 tab(s), 5 Refill(s), Pharmacy: [facility]  Exelon 9.5 mg/24 hr transdermal film, extended release: 1 patch(es), TD, Daily, # 30 patch(es), 5 Refill(s), Pharmacy: [facility], Patient Education Provided, Patient Verbalizes Understanding  Flomax 0.4 mg  oral capsule: 0.4 mg = 1 cap(s), Oral, Daily, # 30 cap(s), 0 Refill(s)  Namenda XR 28 mg oral capsule, extended release: 28 mg = 1 cap(s), Oral, Daily, # 30 cap(s), 5 Refill(s), Pharmacy: [facility]  Pyridium 200 mg oral tablet: 200 mg = 1 tab(s), Oral, TID, PRN as needed for urinary discomfort, # 20 tab(s), 0 Refill(s)  QUEtiapine 50 mg oral tablet: 50 mg = 1 tab(s), Oral, Daily, # 30 tab(s), 5 Refill(s), Pharmacy: [facility]  Seroquel 100 mg oral tablet: 100 mg = 1 tab(s), Oral, At Bedtime, # 30 tab(s), 0 Refill(s), Pharmacy: [facility]  Viibryd 20 mg oral tablet: 20 mg = 1 tab(s), Oral, Daily, # 30 tab(s), 5 Refill(s), Pharmacy: [facility]  Zoloft 100 mg oral tablet: 150 mg = 1.5 tab(s), Oral, Daily, # 45 tab(s), 5 Refill(s), Pharmacy: [facility]  carbidopa-levodopa 25 mg-100 mg oral tablet: 1 tab(s), Oral, TID, # 90 tab(s), 5 Refill(s), Pharmacy: [facility]  levothyroxine 100 mcg (0.1 mg) oral tablet: 100 mcg = 1 tab(s), Oral, Daily, # 30 tab(s), 5 Refill(s), Pharmacy: [facility]  none: none, See Instructions, Lift chair with lift mechanism, # 1 unit/EA, 0 Refill(s)  Documented Medications  Documented  CARB/LEVO    TAB 10-100MG:   Crestor 20 mg oral tablet: 20 mg = 1 tab(s), Oral, Once a day (at bedtime), 0 Refill(s)  DIVALPROEX   TAB 250MG DR: 250 mg = 1 tab(s), Oral, BID  DIVALPROEX   TAB 500MG ER: 1,000 mg, At Bedtime  EnLyte oral capsule: 1 cap(s), Oral, Daily, # 90 cap(s), 0 Refill(s)  Exelon 9.5 mg/24 hr transdermal film, extended release: 1 patch(es), TD, Daily, # 30 patch(es), 0 Refill(s)  LEVOTHYROXIN TAB 100MC mcg = 1 tab(s), Oral, Daily  MEMANTINE HC CAP 28MG ER:   MiraLax oral powder for reconstitution: 17 gm, Oral, Daily, dissolve in water before taking, # 1 bottle(s), 0 Refill(s)  QUETIAPINE   TAB 100MG:   QUETIAPINE   TAB 25M mg = 1 tab(s), Oral, qPM  SERTRALINE   TAB 100MG:   TAMSULOSIN   CAP 0.4M.4 mg = 1 cap(s), Oral,  Daily  VENTOLIN HFA AER:   VENTOLIN HFA AER:   VIIBRYD      TAB 20M mg = 1 tab(s), Oral, Daily  garcinia cambogia: garcinia cambogia, 1 tab, Oral, Daily, 0 Refill(s).   Immunizations: Up to date.      Past Medical/ Family/ Social History   Medical history:    Resolved  Discharge planning ([id number]): Onset on 2012 at 71 years.  Resolved.  Anxiety (300.00):  Resolved.  Asthma (173830486):  Resolved.  BPH (600.00):  Resolved.  Dyslipidemia (272.4):  Resolved.  Viral encephalitis ([id number]):  Resolved.,   Parkinson's disease  Alzheimer's.   Surgical history:    Lumbar Puncture (.) on 2017 at 76 Years.  Comments:  2017 11:27 - Elina Ernst  auto-populated from documented surgical case  Cystoscopy w/ Ureteroscope Laser Tripsy on 10/3/2014 at 73 Years.  Comments:  10/4/2014 00:08 - Ce Chicas RN  auto-populated from documented surgical case  Cystoscopy w/ Ureteral Stent on 10/3/2014 at 73 Years.  Comments:  10/4/2014 00:08 - Ce Chicas RN  auto-populated from documented surgical case  Hydrocele operation ([id number]).  Tonsillectomy ([id number])..   Family history:    Brother  Diabetes mellitus type 2  Congestive heart disease.  Father:  ()  Cause of Death: Unknown  Age at death unknown.   Diabetes mellitus type 2  Mother:  ()  Cause of Death: Colon cancer  Age at death unknown.   Primary malignant neoplasm of colon  .   Social history: Alcohol use: former drinker, Tobacco use: Occasionally, Drug use: Denies.      Physical Examination               Vital Signs   Vital Signs   2018 3:33 CDT       Peripheral Pulse Rate     74 bpm                             Respiratory Rate          18 br/min                             SpO2                      99 %                             Oxygen Therapy            Room air                             Systolic Blood Pressure   169 mmHg  HI                             Diastolic Blood Pressure  88 mmHg                              Mean Arterial Pressure, Cuff              115 mmHg    8/23/2018 2:20 CDT       Peripheral Pulse Rate     81 bpm                             Respiratory Rate          20 br/min                             SpO2                      97 %                             Oxygen Therapy            Room air                             Systolic Blood Pressure   148 mmHg  HI                             Diastolic Blood Pressure  94 mmHg  HI                             Mean Arterial Pressure, Cuff              112 mmHg    8/23/2018 2:09 CDT       Temperature Oral          37.2 DegC                             Temperature Oral (calculated)             98.96 DegF                             Peripheral Pulse Rate     85 bpm                             Respiratory Rate          18 br/min                             SpO2                      100 %                             Oxygen Therapy            Room air                             Systolic Blood Pressure   148 mmHg  HI                             Diastolic Blood Pressure  92 mmHg  HI  .   Measurements   8/23/2018 2:09 CDT       Weight Dosing             82 kg                             Weight Measured and Calculated in Lbs     180.78 lb                             Weight Estimated          82 kg                             Height/Length Dosing      160 cm                             Height/Length Estimated   160 cm                             Body Mass Index Estimated 32.03 kg/m2  .   Basic Oxygen Information   8/23/2018 3:33 CDT       SpO2                      99 %                             Oxygen Therapy            Room air    8/23/2018 2:20 CDT       SpO2                      97 %                             Oxygen Therapy            Room air    8/23/2018 2:09 CDT       SpO2                      100 %                             Oxygen Therapy            Room air  .   General:  Alert, mild distress.    Skin:  Warm, dry.    Head:  Normocephalic,  atraumatic.    Neck:  Supple.   Eye:  Pupils are equal, round and reactive to light, extraocular movements are intact.    Ears, nose, mouth and throat:  Oral mucosa moist, no pharyngeal erythema or exudate.    Cardiovascular:  Regular rate and rhythm, No murmur, Normal peripheral perfusion, 2+ right lower extremity edema 1+ left lower.    Respiratory:  Lungs are clear to auscultation, respirations are non-labored, breath sounds are equal, Symmetrical chest wall expansion.    Chest wall:  No tenderness.   Back:  Nontender, Normal range of motion, Normal alignment.    Musculoskeletal:  Normal ROM, normal strength, no tenderness.    Gastrointestinal:  Soft, Nontender, Non distended, Normal bowel sounds.    Neurological:  No focal neurological deficit observed, CN II-XII intact, Cognitive function: Oriented x 1.    Lymphatics:  No lymphadenopathy.   Psychiatric:  Cooperative, appropriate mood & affect, normal judgment, non-suicidal.       Medical Decision Making   Documents reviewed:  Emergency department nurses' notes, emergency department records, Evaluated in the emergency room on 712 of this year patient improved with IV fluids to baseline.    Orders  Launch Order Profile (Selected)   Inpatient Orders  Ordered  EKG Adult 12 Lead: 08/23/18 3:41:00 CDT, Stat, 08/23/18 3:41:00 CDT, Patient Bed, Standard Precautions, EKG if >30 years of age and pain at or above umbilicus or history of ischemic heart disease., -1, -1, 08/23/18 3:41:00 CDT  EKG if >30 years of age and pain at or above umbilicus or history of ischemic heart disease.: 08/23/18 3:41:00 CDT, EKG if >30 years of age and pain at or above umbilicus or history of ischemic heart disease.  IVF Normal Saline NS Bolus 500ml 500 mL: 500 mL, 500 mL, IV, 500 mL/hr, order duration: 1 dose(s), start date 08/23/18 3:41:00 CDT, stop date 08/23/18 4:40:00 CDT, bolus dose: 500 mL, Then 125 ml/hr if no history of CHF.  If hemodynamically unstable (BP < 100 or Pulse >  110-Notify MD Stat). IV bolus with 500 ml NS.: 08/23/18 3:41:00 CDT, If hemodynamically unstable (BP < 100 or Pulse > 110-Notify MD Stat). IV bolus with 500 ml NS.  NPO: 08/23/18 3:41:00 CDT, CM NPO  Saline Lock Insert: 08/23/18 3:41:00 CDT, Stop date 08/23/18 3:41:00 CDT  Vital Signs Notify Provider: 08/23/18 3:41:00 CDT, if hemodynamically unstable (BP < 100 or Pulse > 110-Notify MD Stat)., HR > 110, SBP < 100  Ordered (Exam Ordered)  CT Head W/O Contrast: Stat, 08/23/18 3:41:00 CDT, Altered level of Consciousness, None, Patient Bed, Rad Type, Schedule this test, 08/23/18 3:41:00 CDT  Ordered (In-Lab)  Automated Diff: STAT collect, 08/23/18 3:49:00 CDT, Blood, Collected, Stop date 08/23/18 3:49:00 CDT, Lab Collect, Print Label By Order Location, 08/23/18 3:41:00 CDT  CBC w/ Auto Diff: STAT collect, 08/23/18 3:49:49 CDT, BLOOD, Collected, Stop date 08/23/18 3:49:00 CDT, Lab Collect  CMP: STAT collect, 08/23/18 3:49:49 CDT, BLOOD, Collected, Stop date 08/23/18 3:49:00 CDT, Lab Collect  Mg Level: STAT collect, 08/23/18 3:49:49 CDT, BLOOD, Collected, Stop date 08/23/18 3:41:00 CDT, Lab Collect  POC ISTAT Chem8 Request:: Blood, Stat collect, Collected, 08/23/18 3:41:00 CDT, Stop date 08/23/18 3:41:00 CDT, Lab Collect, Print Label By Order Location  POC iSTAT ER Troponin request: Blood, Stat collect, Collected, 08/23/18 3:41:00 CDT, Stop date 08/23/18 3:41:00 CDT, Lab Collect, Print Label By Order Location  Phosphorus Level: STAT collect, 08/23/18 3:49:49 CDT, BLOOD, Collected, Stop date 08/23/18 3:49:00 CDT, Lab Collect  Valproic Acid Level: STAT collect, 08/23/18 3:49:49 CDT, BLOOD, Collected, Stop date 08/23/18 3:49:00 CDT, Lab Collect  Canceled  Clinic Follow up: *Est. 02/20/19 3:00:00 CST, Alz/P.D., Order for future visit, Alzheimer disease  Parkinson disease, [facility]  Completed  Urinalysis Complete a reflex to culture: Stat collect, Urine, 08/23/18 2:24:00 CDT, Once, Stop date 08/23/18 2:25:00 CDT, Nurse  collect, Print Label By Order Location  Discontinued  Urinalysis Complete a reflex to culture: Stat collect, Urine, 08/23/18 3:41:00 CDT, Stop date 08/23/18 3:41:00 CDT, Nurse collect, Print Label By Order Location.   Results review:  Lab results : [facility]   8/23/2018 2:23 CDT       UA Appear                 CLEAR                             UA Color                  YELLOW                             UA Spec Grav              1.021                             UA Bili                   Negative                             UA pH                     6.5                             UA Urobilinogen           1.0                             UA Blood                  Negative                             UA Glucose                Negative                             UA Ketones                Negative                             UA Protein                Negative                             UA Nitrite                Negative                             UA Leuk Est               Negative                             UA WBC                    NONE SEEN /HPF                             UA RBC                    NONE SEEN                             UA Bacteria               NONE SEEN /HPF                             UA Squam Epithelial       NONE SEEN  , Lab results : [facility]   8/23/2018 3:49 CDT       Sodium Lvl                140 mmol/L                             Potassium Lvl             4.2 mmol/L                             Chloride                  104 mmol/L                             CO2                       31.0 mmol/L                             Calcium Lvl               8.6 mg/dL                             Magnesium Lvl             2.2 mg/dL                             Glucose Lvl               101 mg/dL                             BUN                       28.0 mg/dL  HI                             Creatinine                0.91 mg/dL                             eGFR-AA                   >60 mL/min/1.73 m2  NA                              eGFR-SUSHMA                  >60 mL/min/1.73 m2  NA                             Bili Total                0.3 mg/dL                             Bili Direct               0.10 mg/dL                             Bili Indirect             0.20 mg/dL                             AST                       25 unit/L                             ALT                       19 unit/L                             Alk Phos                  77 unit/L                             Total Protein             6.9 gm/dL                             Albumin Lvl               2.90 gm/dL  LOW                             Globulin                  4.00 gm/dL  HI                             A/G Ratio                 0.7  NA                             Phosphorus                3.7 mg/dL                             Troponin-I                <0.02 ng/mL                             WBC                       4.9 x10(3)/mcL                             RBC                       4.14 x10(6)/mcL  LOW                             Hgb                       10.9 gm/dL  LOW                             Hct                       34.5 %  LOW                             Platelet                  146 x10(3)/mcL                             MCV                       83.3 fL                             MCH                       26.3 pg  LOW                             MCHC                      31.6 gm/dL  LOW                             RDW                       17.2 %  HI                             MPV                       9.3 fL  LOW                             Abs Neut                  1.56 x10(3)/mcL  LOW                             Neutro Auto               32 %  NA                             Lymph Auto                56 %  NA                             Mono Auto                 10 %  NA                             Eos Auto                  1 %  NA                             Abs Eos                   0.1 x10(3)/mcL                              Basophil Auto             0 %  NA                             Abs Neutro                1.56 x10(3)/mcL                             Abs Lymph                 2.7 x10(3)/mcL                             Abs Mono                  0.5 x10(3)/mcL                             Abs Baso                  0.0 x10(3)/mcL                             Valpro Acid Lvl           90.0 mcg/mL    8/23/2018 2:23 CDT       UA Appear                 CLEAR                             UA Color                  YELLOW                             UA Spec Grav              1.021                             UA Bili                   Negative                             UA pH                     6.5                             UA Urobilinogen           1.0                             UA Blood                  Negative                             UA Glucose                Negative                             UA Ketones                Negative                             UA Protein                Negative                             UA Nitrite                Negative                             UA Leuk Est               Negative                             UA WBC                    NONE SEEN /HPF                             UA RBC                    NONE SEEN                             UA Bacteria               NONE SEEN /HPF                             UA Squam Epithelial       NONE SEEN  .    Head Computed Tomography:  Time reported 8/23/2018 04:17:00, interpretation by Radiologist ([email or web address]),   No acute intracrania process identified.  No acute infarct is identified.  .       Reexamination/ Reevaluation   Time: 8/23/2018 05:53:00 .   Interventions: Patient with a history of previous dehydration with confusion wife states that patient is normally oriented ×3 no suppressant patientwho she is in no sweats city he is in but does not know the year date patient with elevated BUN normal creatinine ratio consistent with  dehydration given 500 mL normal saline without any significant improvement will admit for further evaluation.      Impression and Plan   Diagnosis   Acute dehydration (JXX55-YY E86.0)   Reactive confusion (OQI77-TW F44.89)      Calls-Consults   -  K murry-admirt.   Plan   Condition: Improved, Stable.    Disposition: Admit time  8/23/2018 05:57:00.    Counseled: Patient, Family, Friend, Regarding diagnosis, Regarding diagnostic results, Regarding treatment plan, Regarding prescription, Patient indicated understanding of instructions, Wife understood, Nurse understood.    Notes: I, Comfort Ozuna, acted solely as a scribe for and in the presence of Dr. Hsieh who performed the service.,       This scribes note accurately reflects the work done by me I have reviewed the note and personally performed a history and physical and agree with all the documentation and findings.

## 2022-05-04 NOTE — HISTORICAL [facility] CERNER
This is a historical note converted from [employer]. Formatting and pictures may have been removed.  Please reference [facility] for original formatting and attached multimedia. Neurology ER consult  ?  Reason for consult: Parkinsons and dementia with confusion and inability to walk last evening.  ?  HPI: History obtained from the wife. ?I have not yet been able to review hospitalist notes. ?Did discuss with the ER nurse whose had him all day given the hospital is full and he has not yet gotten a room  ?  The wife describes advancing Parkinsons and dementia and recurrent bouts of dehydration  ?  Outpatient neurologist Dr. Gurvinder Calderon.  ?  Wife describes her recent hospital admission for dehydration perhaps 1 month ago. ?Wife describes that the patient will not drink because he does not like to go to the bathroom frequently. ?She describes wet diapers often discern that he urinates more than he has oral fluid intake. ?She wants us to learn why this is happening and to fix it.  ?  Review of systems: He denies that he has much problem with choking. ?She has 24 7 around-the-clock nursing aide care at home. ?He has home health. ?She is asking if he could get IV fluids at home.  ?  Exam: Elderly man. ?Friendly. ?Says he has a david wife. ?I told him I concur. ?She seems quite concerned about him and she seems as if she is taking excellent care of him.  ?  He has no cranial nerve deficits. ?Visual fields and eye movements are normal. ?Face is symmetric. ?Speech is clear.  ?  Motor exam shows no lateralizing weakness now but last evening they were concerned that he may have had some right-sided weakness area the nurse said when she assessed him when she came on shift early this morning at 7 AM she did not perceive that he had any right-sided weakness. ?Additionally, his MRI does not show any recent ischemic features.  ?  His coordination showed normal finger-nose testing  ?  He may have a minor tremor of the left  hand  ?  Medications: I know he takes levodopa and Seroquel but I did not review and exhaustive list of medications  ?  Laboratories were reviewed and I did make notes about a BUN being 28 and a hematocrit of 35 but other specifics must have been normal since I did not make note of them.  ?  Imaging: I did review brain MRI images and a anemia. ?He does have marked atrophy and likely degenerative white matter features.  ?  Problem list:  1. ?Advancing parkinsonism  2. ?Advancing dementia  3. ?Recurrent dehydration  4. ?Recent behavioral decompensations with gait inability of unclear etiology. ?Dehydration could be playing a role. ?Neurodegeneration likely playing a role as well.  5. ?Reported urinary frequency  ?  Recommendations: I shared my thoughts with the wife. ?I fear that the gentleman will likely continue to have decompensations. ?We discussed that a facility may ultimately be required but the wife would like to continue to keep him at home as long as she possibly can. ?I will reassess the patient and discuss issues more with the wife tomorrow and through the weekend if he still here. ?However, discharge tomorrow might be reasonable if he is improved and the wife is eager to take him home.  ?  Apologies for errors in electronic transcription

## 2022-05-04 NOTE — HISTORICAL [facility]ERNER
This is a historical note converted from [employer]. Formatting and pictures may have been removed.  Please reference [employer] for original formatting and attached multimedia. Chief Complaint  Pt. states no complaints, but wife reports pt. has being talking out of his head and having trouble waking and holding his urine for the last 2 days, hx Alzheimer?s , pt. wife unsure if pt. is dehyrdated or if alzheimers is worseing  History of Present Illness  The patient?is a?78 yo  male with hx of Parkinsons, Alzheimers, and encephalitis who presented to the ED on 8/23/18 with complaints of confusion, weakness, and urinary incontinence since around midnight. Patient is a poor historian and history was obtained from prior medical records and ED visit. Sitter in room during time of visit. Wife?reported patient had eaten his breakfast really fast and began to vomit on the way to a doctors appointment on 2 days ago. She says he normally has a significant appetite, but a poor intake of fluids. She adds patient was unusually disoriented from his baseline. Wife said patient recovered from N/V episode and was thinking and feeling better yesterday. This morning around 0000, wife states patient woke up soaking wet and asking for something to eat. Wife explained he was needing to go to the restroom, but found it difficult to ambulate to the toilet, even with help from his walker. She added patient seems confused too, and is unable to answer what year it is while in the ED. Wife also complained of RLE swelling and right eye swelling that this is been chronic. Patient denies having any headache, abdominal pain, chest pain, nausea, or rash at present. Labwork in the ED was essentially unremarkable, normal electrolytes, negative troponin, and normal LFTs?with the exception of low albumin 2.9, and a low Hbg 10.9. UA in the ED was also unremarkable. CT Head in the ED showed No acute intracranial process or acute infarct?identified. Brain  MRI was negative as well. Patient has been admitted to hospitalist services for further management and neurology has been consulted.  Review of Systems  Constitutional:?no fever, fatigue  Eye:?no vision loss, eye redness, drainage, or pain  ENMT:?no sore throat, ear pain, sinus pain/congestion, nasal congestion/drainage  Respiratory:?no cough, no wheezing, no shortness of breath  Cardiovascular:?no chest pain, no palpitations, no edema  Gastrointestinal:?no nausea, vomiting, or diarrhea. No abdominal pain  Genitourinary:?no hematuria  Hema/Lymph:?no abnormal bruising or bleeding  Endocrine:?no heat or cold intolerance, no excessive thirst or excessive urination  Musculoskeletal:?no muscle or joint pain, no joint swelling  Integumentary:?no skin rash or abnormal lesion  Neurologic: no headache, no dizziness  Except as documented all systems were reviewed and are negative.  Physical Exam  Vitals & Measurements  T:?37.2? ?C (Oral)? HR:?84(Peripheral)? RR:?16? BP:?153/117? SpO2:?94%? WT:?82?kg?  GENERAL: awake and in no acute distress, talkative, obese, comfortable  HEENT: NC/AT, EOMI, PERRL. Conjunctiva are pink and sclera are white. OP clear of erythema or exudate  NECK: Supple, no LAD  LUNGS: CTA B/L, no rales or rhonchi, no peripheral cyanosis  CVS: RRR, S1S2 positive, no JVD, no carotid bruit  ABD: Soft, non-tender, non-distended, bowel sounds present  EXTREMITIES: peripheral pulses 2+, no peripheral edema  SKIN: Warm, dry. No rashes or lesions  : Wearing a brief.  NEURO: Alert, oriented to president and name, not location or year, no focal neurological deficit  PSYCHIATRIC: Cooperative, appropriate mood and affect.  ?  Assessment/Plan  Reactive confusion  ? CT Head and Brain MRI 8/23/18 negative  ? Neuro consulted  Alzheimers Disease  ?  Parkinsons Disease  ?  History of Viral Encephalitis  ?  Urinary Incontinence  -UA negative  ?  Plan:  Consult neuro for confusion, worse than baseline  Resume home meds as  appropriate  ?  All diagnosis and differential diagnosis have been reviewed; assessment and plan has been documented; I have personally reviewed the labs and test results that are presently available; I have reviewed the patients medication list; I have reviewed the consulting providers response and recommendations. I have reviewed or attempted to review medical records based upon their availability.  ?  I, Amanda Adam, acted solely as a scribe for and in the presence of Dr. Rich Funk MD who performed the service.  ?   I, Dr. Rich Funk, personally performed the services described in this documentation as scribed in my presence and it is both accurate and complete.   Problem List/Past Medical History  Ongoing  Activity intolerance  ADL  Anxiety  Cognitive deficits  Endurance  Language-related cognitive disorder  Memory loss  Mobility ability  Obesity  Oropharyngeal dysphagia  Tobacco user  Historical  Anxiety  Asthma  BPH  Discharge planning  Dyslipidemia  Viral encephalitis  Procedure/Surgical History  Drainage of Spinal Canal, Percutaneous Approach, Diagnostic (09/28/2017)  Fluoroscopy of Spinal Cord (09/28/2017)  Lumbar Puncture (.) (09/28/2017)  Spinal puncture, lumbar, diagnostic (09/28/2017)  Cystoscopy w/ Ureteral Stent (10/03/2014)  Cystoscopy w/ Ureteroscope Laser Tripsy (10/03/2014)  Retrograde pyelogram (10/03/2014)  Transurethral removal of obstruction from ureter and renal pelvis (10/03/2014)  Ultrasonic fragmentation of urinary stones (10/03/2014)  Ureteral catheterization (10/03/2014)  Spinal tap (07/23/2012)  Spinal tap (07/03/2012)  Continuous invasive mechanical ventilation for less than 96 consecutive hours (07/02/2012)  Insertion of endotracheal tube (07/02/2012)  Central Venous Catheter Placement with Guidance (06/30/2012)  Spinal tap (06/29/2012)  Hydrocele operation  Tonsillectomy   Medications  Inpatient  acetaminophen, 1000 mg= 2 tab(s), Oral, q6hr, PRN  atorvastatin 40  mg oral tablet, 80 mg= 2 tab(s), Oral, Daily  carbidopa-levodopa 25 mg-100 mg oral tablet, 1 tab(s), Oral, TID  Depakote  mg oral tablet, extended release, 1000 mg= 2 tab(s), Oral, Once a day (at bedtime)  divalproex  mg (DR) oral delayed release (EC) tablet, 250 mg= 1 tab(s), Oral, BID  Exelon 9.5 mg/24 hr transdermal film, extended release, 9.5 mg= 1 patch(es), TD, Daily  Flomax 0.4 mg oral capsule, 0.4 mg= 1 cap(s), Oral, Daily  labetalol 5 mg/mL intravenous solution, 20 mg= 4 mL, IV Push, q2hr, PRN  levothyroxine 100 mcg (0.1 mg) oral tablet, 100 mcg= 1 tab(s), Oral, Daily  MiraLax (polyethylene glycol 3350), 17 gm= 1 packet(s), Oral, Daily  Namenda XR 28 mg oral capsule, extended release, 1 cap, Oral, Daily  QUEtiapine, 100 mg= 1 tab(s), Oral, At Bedtime  QUEtiapine 25 mg oral tablet, 50 mg= 2 tab(s), Oral, Daily  sertraline, 150 mg= 3 tab(s), Oral, Daily  Sodium Chloride 0.9% intravenous solution 1,000 mL, 1000 mL, IV  VIIBRYD TAB 20MG, 1 tab, Oral, Daily  Zofran, 4 mg= 2 mL, IV Push, q4hr, PRN  Home  CARB/LEVO TAB 10-100MG  carbidopa-levodopa 25 mg-100 mg oral tablet, 1 tab(s), Oral, TID, 5 refills  Crestor 20 mg oral tablet, 20 mg= 1 tab(s), Oral, Once a day (at bedtime),? ?Not taking  Depakote 250 mg oral delayed release tablet, See Instructions, 5 refills  Depakote  mg oral tablet, extended release, 1000 mg= 2 tab(s), Oral, Once a day (at bedtime), 5 refills  DIVALPROEX TAB 250MG DR, 250 mg= 1 tab(s), Oral, BID  DIVALPROEX TAB 500MG ER, 1000 mg, At Bedtime  EnLyte oral capsule, 1 cap(s), Oral, Daily  Exelon 9.5 mg/24 hr transdermal film, extended release, 1 patch(es), TD, Daily  Exelon 9.5 mg/24 hr transdermal film, extended release, 1 patch(es), TD, Daily, 5 refills  Flomax 0.4 mg oral capsule, 0.4 mg= 1 cap(s), Oral, Daily  garcinia cambogia, 1 tab, Oral, Daily  LEVOTHYROXIN TAB 100MCG, 100 mcg= 1 tab(s), Oral, Daily  levothyroxine 100 mcg (0.1 mg) oral tablet, 100 mcg= 1 tab(s), Oral,  Daily, 5 refills  MEMANTINE HC CAP 28MG ER  MiraLax oral powder for reconstitution, 17 gm, Oral, Daily,? ?Still taking, not as prescribed  Namenda XR 28 mg oral capsule, extended release, 28 mg= 1 cap(s), Oral, Daily, 5 refills  none, See Instructions  Pyridium 200 mg oral tablet, 200 mg= 1 tab(s), Oral, TID, PRN,? ?Not taking  QUETIAPINE TAB 100MG  QUETIAPINE TAB 25MG, 25 mg= 1 tab(s), Oral, qPM  QUEtiapine 50 mg oral tablet, 50 mg= 1 tab(s), Oral, Daily, 5 refills  Seroquel 100 mg oral tablet, 100 mg= 1 tab(s), Oral, At Bedtime  SERTRALINE TAB 100MG  TAMSULOSIN CAP 0.4MG, 0.4 mg= 1 cap(s), Oral, Daily  VENTOLIN HFA AER,? ?Not taking: quit Last Dose Date/Time Unknown  VENTOLIN HFA AER  VIIBRYD TAB 20MG, 20 mg= 1 tab(s), Oral, Daily  Viibryd 20 mg oral tablet, 20 mg= 1 tab(s), Oral, Daily, 5 refills  Zoloft 100 mg oral tablet, 150 mg= 1.5 tab(s), Oral, Daily, 5 refills  Allergies  No Known Allergies  Social History  Alcohol  Past, Beer, Stopped age 77 Years. Previous treatment: None., 06/11/2018  Home/Environment  Lives with Spouse. Living situation: Home/Independent., 06/28/2012  Substance Abuse - Denies Substance Abuse, 10/03/2014  Never, 06/11/2018  Tobacco - High Risk, 06/28/2012  Light tobacco smoker Use:. Previous treatment: None., 08/20/2018  Family History  Congestive heart disease.: Brother.Negative: Mother.  Diabetes mellitus type 2: Father and Brother.  Primary malignant neoplasm of colon: Mother.  Immunizations  Vaccine Date Status Comments   pneumococcal 23-polyvalent vaccine - Not Given Patient Refuses  patient changed his mind and refused vaccine   Lab Results  Labs Last 24 Hours?  ?Chemistry? Hematology/Coagulation? Therapeutic Drug Levels?   Sodium Lvl: 140 mmol/L (08/23/18 03:49:00 CDT) WBC: 4.9 x10(3)/mcL (08/23/18 03:49:00 CDT) Valpro Acid Lvl: 90 mcg/mL (08/23/18 03:49:00 CDT)   Potassium Lvl: 4.2 mmol/L (08/23/18 03:49:00 CDT) RBC:?4.14 x10(6)/mcL?Low (08/23/18 03:49:00 CDT)    Chloride: 104  mmol/L (08/23/18 03:49:00 CDT) Hgb:?10.9 gm/dL?Low (08/23/18 03:49:00 CDT)    CO2: 31 mmol/L (08/23/18 03:49:00 CDT) Hct:?34.5 %?Low (08/23/18 03:49:00 CDT)    Calcium Lvl: 8.6 mg/dL (08/23/18 03:49:00 CDT) Platelet: 146 x10(3)/mcL (08/23/18 03:49:00 CDT)    Magnesium Lvl: 2.2 mg/dL (08/23/18 03:49:00 CDT) MCV: 83.3 fL (08/23/18 03:49:00 CDT)    Glucose Lvl: 101 mg/dL (08/23/18 03:49:00 CDT) MCH:?26.3 pg?Low (08/23/18 03:49:00 CDT)    BUN:?28 mg/dL?High (08/23/18 03:49:00 CDT) MCHC:?31.6 gm/dL?Low (08/23/18 03:49:00 CDT)    Creatinine: 0.91 mg/dL (08/23/18 03:49:00 CDT) RDW:?17.2 %?High (08/23/18 03:49:00 CDT)    eGFR-AA: >60 (08/23/18 03:49:00 CDT) MPV:?9.3 fL?Low (08/23/18 03:49:00 CDT)    eGFR-SUSHMA: >60 (08/23/18 03:49:00 CDT) Abs Neut:?1.56 x10(3)/mcL?Low (08/23/18 03:49:00 CDT)    Bili Total: 0.3 mg/dL (08/23/18 03:49:00 CDT) Neutro Auto: 32 % (08/23/18 03:49:00 CDT)    Bili Direct: 0.1 mg/dL (08/23/18 03:49:00 CDT) Lymph Auto: 56 % (08/23/18 03:49:00 CDT)    Bili Indirect: 0.2 mg/dL (08/23/18 03:49:00 CDT) Mono Auto: 10 % (08/23/18 03:49:00 CDT)    AST: 25 unit/L (08/23/18 03:49:00 CDT) Eos Auto: 1 % (08/23/18 03:49:00 CDT)    ALT: 19 unit/L (08/23/18 03:49:00 CDT) Abs Eos: 0.1 x10(3)/mcL (08/23/18 03:49:00 CDT)    Alk Phos: 77 unit/L (08/23/18 03:49:00 CDT) Basophil Auto: 0 % (08/23/18 03:49:00 CDT)    Total Protein: 6.9 gm/dL (08/23/18 03:49:00 CDT) Abs Neutro: 1.56 x10(3)/mcL (08/23/18 03:49:00 CDT)    Albumin Lvl:?2.9 gm/dL?Low (08/23/18 03:49:00 CDT) Abs Lymph: 2.7 x10(3)/mcL (08/23/18 03:49:00 CDT)    Globulin:?4 gm/dL?High (08/23/18 03:49:00 CDT) Abs Mono: 0.5 x10(3)/mcL (08/23/18 03:49:00 CDT)    A/G Ratio: 0.7 (08/23/18 03:49:00 CDT) Abs Baso: 0 x10(3)/mcL (08/23/18 03:49:00 CDT)    Phosphorus: 3.7 mg/dL (08/23/18 03:49:00 CDT)     Troponin-I: <0.02 (08/23/18 03:49:00 CDT)     ?  ?  Diagnostic Results  MRI Brain W/O Contrast?08/23/2018?  IMPRESSION:?  No acute intracranial abnormalities.  ?  ?  ?CT  Head W/O Contrast?08/23/2018  Impression  No acute intracranial hemorrhage. 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  The bilateral ventricles are prominent. This appears unchanged.  Nonobstructive Hydrocephalus is not totally excluded.  ?  ?  ?